# Patient Record
(demographics unavailable — no encounter records)

---

## 2025-05-15 NOTE — ASSESSMENT
[FreeTextEntry1] : Appropriate labs were drawn in this office today.  yg ptneed forneurosurgery an ednocrine input for pituitary abnormality given visual changes

## 2025-05-15 NOTE — HISTORY OF PRESENT ILLNESS
[FreeTextEntry1] : Pt is here for follow up of multiple medical problems includinghypercalcemia, predm, hyperlipidemia.

## 2025-05-23 NOTE — CONSULT LETTER
[Dear  ___] : Dear  [unfilled], [Courtesy Letter:] : I had the pleasure of seeing your patient, [unfilled], in my office today. [Sincerely,] : Sincerely, [FreeTextEntry2] : Krissy Rosario [facility] [id number] [FreeTextEntry1] : This is a 68-year-old female who works as an  and presents to our office for an initial neurosurgical consultation.  The patient reports she has a known high calcium level and visual changes for the past year.  The patient endorses that in December 2023 she was hit by a fire door in the back of her head while standing on a line in [place].  The patient did not have any loss of consciousness but had disequilibrium symptoms at the time of the head injury.  The patient was evaluated in the emergency room that day with no surgical intervention and a concussion was diagnosed.  Since December 2023 the patient has had ongoing neurological symptoms  including general weakness, fatigue, and trouble swallowing.  In May of 2024, the patient developed double vision.  The patient has seen Dr. Manzanares in the concussion program.  The patient has been worked up for myasthenia gravis by Dr. Salas which was reported as " negative".  The patient also is followed by Dr. Elias Beard, neuro-ophthalmologist, for visual disturbances with no clear etiology.  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    Since the brain MRI was complete, the patient has had no follow-up and no additional images.  Her PCP had ordered endocrine laboratory testing that showed an elevated calcium and normal prolactin levels.  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  The patient has not seen any endocrinologist or had any detailed workup since the MRI of the brain.  The patient is now endorsing general weakness and fatigue with visual disturbances reported as "ribbons" in her eyes.  The patient no longer experiences any double vision.  The patient denies any headaches.  The patient denies any peripheral visual deficit.  At the time of the initial head injury the patient did have photosensitivity and sensitivity but this has resolved.  The patient denies any headaches.  The patient has no tingling or numbness or extremity radicular pain.  The patient has no gait disturbance.  Her disequilibrium symptoms have resolved.  No tinnitus.  The patient has undergone a brain MRI at [facility] dated June 2024.  Unfortunately the images are not present on the [facility] portal.  The report states there was a 2.6 mm pituitary cyst or lesion.  There are no images for comparison.  A prolactin level from [facility] dated May 15, 2025 showed normal value at 12.4.  A calcium level from May 15, 2025 in [facility] was 6, normal range is up to 5.6.  On neurologic examination, the patient is alert and oriented.  Appears well and in no distress.  Speech clear and fluent.  CN intact.  Pupils equal and reactive.  No saccades and no nystagmus.  Visual fields full to confrontation.  No ptosis.  Face sensation intact and symmetrical.  Hearing normal.  Tongue is midline and moves in all directions and does not deviate.  No vibration, temperature, or sensory loss throughout and pin prick normal.  No pronator drift.  No dysmetria of UE and LE.  Full strength in all muscle groups.  Reflexes are 2+ in all extremities and equal and symmetric throughout.  No Mares sign bilaterally. Plantar reflexes are flexor.  IVANNA is intact.  No abnormal extraneous movements.  No clonus.  Romberg is absent.   Gait is steady. Tandem gait normal. Able to walk on heels and toes without difficulty.  Negative straight leg testing bilaterally.  The patient has symptoms consisting of general weakness, fatigue, and visual disturbances.  I have recommended that the patient undergo an updated pituitary MRI with and without contrast.  The patient was recommended to follow-up with imaging at [facility] for comparison.  I have provided the patient with a cortisol prescription which should be obtained since other endocrine levels in the patient's chart were normal.  I recommended that the patient see an endocrinologist for her high calcium level to rule out any parathyroid disease.   The patient does have contact with an endocrinology office and is pending a scheduled visit.  The patient did request an ultrasound of the parathyroid, and I have ordered this as well.  The patient will follow-up with Dr. Smith once these images are complete.  If the patient has any concerns she may contact the office sooner.  It would be in the patient's best interest to return back to Dr. Geoffrey Beard for a neuro-ophthalmology evaluation.  The patient had a good understanding of the plan.  She will contact our office for any concerns.  Thank you for very kindly including me in the evaluation and treatment of your patient.  Please do not hesitate to contact me should you have any concerns or questions regarding the patients neurologic symptoms or proposed follow-up treatment and evaluation plan.  [FreeTextEntry3] : Kimberly Lombardo, DNP, NP Nurse Practitioner [facility] [place] [address] Assistant  [employer] email: [email or web address] <[email or web address]> P- [phone] F- [phone]

## 2025-05-23 NOTE — REVIEW OF SYSTEMS
[Arm Weakness] : arm weakness [Hand Weakness] :  hand weakness [Leg Weakness] : leg weakness [Negative] : Heme/Lymph [Numbness] : no numbness [Tingling] : no tingling [Seizures] : no convulsions [Dizziness] : no dizziness [Fainting] : no fainting [Lightheadedness] : no lightheadedness [Vertigo] : no vertigo [Cluster Headache] : no cluster headache [Migraine Headache] : no migraine headache [Tension Headache] : no tension-type headache [Difficulty Walking] : no difficulty walking [de-identified] : vision changes  general weakness

## 2025-05-23 NOTE — REASON FOR VISIT
[New Patient Visit] : a new patient visit [Other: _____] : [unfilled] [FreeTextEntry1] : Abnormal calcium levels and vision changes

## 2025-05-23 NOTE — DATA REVIEWED
[de-identified] : REPORT ONLY-- Shivamsumi Daveemile has no images and reviewed report of Brain  MRI from 6/2024

## 2025-05-23 NOTE — DATA REVIEWED
[de-identified] : REPORT ONLY-- Shivamsumi Daveemile has no images and reviewed report of Brain  MRI from 6/2024

## 2025-05-23 NOTE — REVIEW OF SYSTEMS
[Arm Weakness] : arm weakness [Hand Weakness] :  hand weakness [Leg Weakness] : leg weakness [Negative] : Heme/Lymph [Numbness] : no numbness [Tingling] : no tingling [Seizures] : no convulsions [Dizziness] : no dizziness [Fainting] : no fainting [Lightheadedness] : no lightheadedness [Vertigo] : no vertigo [Cluster Headache] : no cluster headache [Migraine Headache] : no migraine headache [Tension Headache] : no tension-type headache [Difficulty Walking] : no difficulty walking [de-identified] : vision changes  general weakness

## 2025-07-03 NOTE — CONSULT LETTER
[Dear  ___] : Dear  [unfilled], [Courtesy Letter:] : I had the pleasure of seeing your patient, [unfilled], in my office today. [Sincerely,] : Sincerely, [FreeTextEntry2] : Krissy Rosario MD [facility] [address] [FreeTextEntry3] : Bon Smith MD, PhD, FRCPSC   Attending Neurosurgeon   of Neurosurgery  [facility]  [address], [address]nd [place] [address]  Office: [phone]  Fax: [phone]